# Patient Record
(demographics unavailable — no encounter records)

---

## 2025-03-06 NOTE — CARDIOLOGY SUMMARY
[Today's Date] : [unfilled] [FreeTextEntry1] : NSR, ventricular rate 58 bpm [FreeTextEntry2] : Summary: 1. Technically limited imaging secondary to poor acoustic windows. 2. Tetralogy of Fallot, status post repair. 3. Status post transcatheter pulmonary valve placement (Gutierrez Augusta). 4. S/p LPA stent placement on 2/13/2024. 5. No evidence of pulmonary valve stenosis. 6. Trivial pulmonary valve regurgitation. 7. Stent in proximal left pulmonary artery protruding into distal MPA. Left pulmonary artery is not well visualized. Antegrade flow by color Doppler is not seen; however, there is no gradient on pulse Doppler interrogation. 8. Trivial tricuspid valve regurgitation. 9. The tricuspid regurgitant jet, as recorded, is inadequate for the purpose of estimating right ventricular systolic pressure. 10. No residual ventricular septal defect. 11. Normal left ventricular morphology. 12. Qualitatively normal left ventricular systolic function. 13. Qualitatively normal right ventricular systolic function with no evidence of right ventricular dilation. 14. No pericardial effusion. [de-identified] : 01/28/2025 [de-identified] : Impression: Tetralogy of fallot s/p repair, s/p Edward [place] valve in pulmonary position. S/p LPA stents.   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 Artifact from the Edward Augusta valve in the pulmonary position. No significant pulmonary regurgitation (RF: <1% by PC flows). The right pulmonary artery is of good size. Stent artifact from the left pulmonary artery stent. It appears patent in black blood sequences. Left upper lobe branch is not adequately visualized. Left lower lobe branch seen. The left lung arborization is qualitative less than the right. On MRA, there is no uptake of contrast in the left upper lobes on the first pass.   Estimated QpR: QpL is 80.5%: 19.5% (no significant difference form the lung perfusion scan on 02/14/2024 when it was 80% to right and 20% to left).   Normal biventricular volumes and ejection fractions. No significant change from the scan in 2022.

## 2025-03-06 NOTE — CONSULT LETTER
[Today's Date] : [unfilled] [Name] : Name: [unfilled] [] : : ~~ [Today's Date:] : [unfilled] [Dear  ___:] : Dear Dr. [unfilled]: [Consult] : I had the pleasure of evaluating your patient, [unfilled]. My full evaluation follows. [Consult - Multiple Provider] : Thank you very much for allowing us to participate in the care of this patient. If you have any questions, please do not hesitate to contact us. [Sincerely,] : Sincerely, [FreeTextEntry4] : Rosemarie Lombardi Conigliaro, MD [FreeTextEntry5] : [address], [address] [FreeTextEntry6] : [place] [address] [FreeTextEntry7] : [phone] [de-identified] : Sharon Spencer, MSN, CPNP-AC, PC Pediatric Cardiology, Adult Congenital Cardiology [facility] Physician [facility]  Ayleen Martell MD, SHAHNAZ Director, Adult Congenital Heart , High Risk Cardiovascular Obstetrics [facility] Physician [facility]  [address]: [phone] [facility] Office: [phone] [facility] Office: [phone]

## 2025-03-06 NOTE — DISCUSSION/SUMMARY
[Needs SBE Prophylaxis] : [unfilled]  needs bacterial endocarditis prophylaxis. SBE prophylaxis is indicated for dental and invasive ENT procedures. (Circulation. 2007; 116: 1211-4341) [FreeTextEntry1] : In summary, Cami is a 30-year-old female with tetralogy of Fallot status staged complete repair in infancy with subsequent left pulmonary artery stent angioplasty, s/p percutaneous pulmonary valve replacement.  She is s/p LPA stent 2/13/24 with improvement of her symptoms. Her post procedure V/Q scan was unchanged.  Her last echocardiogram today demonstrates normal prosthetic valve function without any gradient and normal biventricular function.   MRI 01/2025 showed unchanged flow to left lung, QpR:QpL 80.5%:19.5%.  We spent some time discussing long term sequela of his TOF. Tetralogy of Fallot is a wide spectrum of disease with varying levels of severity. The main features include an aorta that overrides the VSD by less than 50% of its diameter, subpulmonary infundibular stenosis, a VSD, and RV hypertrophy. Tetralogy of Fallot repair essentially involves closing the VSD and removing RVOT obstruction. Depending on the individual's anatomy, elimination of the RVOT obstruction may be accomplished by resection of infundibular (muscle) stenosis, RVOT patch augmentation, reconstruction of the pulmonary valve annulus with a transannular patch or placing an extra cardiac conduit from the right ventricle to the pulmonary artery. Pulmonary valvotomy or pulmonary valve resection may be necessary if the pulmonary valve itself is stenotic.  The long-term prognosis for patients after repair of tetralogy of Fallot continues to improve, however, long complications occur. These long-term complications include the development of progressive aortic root dilation, aortic regurgitation, LV dysfunction, right ventricular outflow tract obstruction, pulmonary regurgitation, RV dysfunction, high-grade heart block (relatively uncommon), development of atrial tachyarrhythmias, endocarditis, sustained ventricular tachycardia, and sudden cardiac death. In a recent study >50% of patients with TOF over 50 years have heart failure.  Pulmonary valve replacement is indicated with moderate or more RI with symptoms or any 2 of the following: RV/LV dysfunction, severe RV dilation (RVEDVI>160 mL/m2, RVESVI >80mL/m2 or RVEDV>2x LVEDV, RVSP due to RVOT obstruction >2/3 systemic pressure or progressive reduction in exercise tolerance. In addition, sustained tachyarrhythmia in the setting of severe RI.  As per ACC ACHD guidelines, Stage B patients, with mild disease, require echos q 1-2 years, CMI q 2-3 years and exercise testing 2-5 years. Stage C include patients with moderate to severe valve disease, moderate ventricular dysfunction, NYHA C III, arrhythmias controlled with treatment. These patients require yearly echo, and holter, cMRI and exercise stress tests q1-2 years. Those with Stage D disease including severe pulmonary HTN, severe hypoxia, Eisenmenger, refractory arrhythmia or NYHA FC IV symptoms, may require echos and other testing more frequently. As per guidelines, he will have a cMRI, holter and stress test.  Plan: - Can get elective dental work at this time.  - Continue aspirin 81mg PO qday.  -    SBE Prophylaxis We also discussed good dental hygiene with routine dental visits every six months. As per ACC guidelines, endocarditis prophylaxis is recommended in those undergoing dental procedures with prosthetic cardiac valves, unrepaired cyanotic heart disease including palliative shunts and conduits, repaired CHD with residual defects at site of adjacent to patch or prosthetic device, repaired CHD with prosthetic material or device during the first 6 months after the procedure and in those patients with a history of infective endocarditis. Amoxicillin 2gm or another appropriate antibiotic should be taken 30 to 60 minutes prior to the procedure.  Avoid dental work for 3 months post stent.    Heart Healthy Lifestyle We discussed a heart-healthy lifestyle, including Mediterranean diet, weight management, and avoiding smoking/vaping and excessive alcohol. We also discussed exercising 30 minute a day.   Medical Home  Part of today's discussion included identifying a core medical team for as the patient's medical home. It is important that the patient feels comfortable with providers in subspecialties and that the providers are well-versed in the patient's cardiac condition. We can provide referrals to doctors who we have identified as collaborative and conscientious of the patient's complex medical history.

## 2025-03-06 NOTE — REASON FOR VISIT
[Follow-Up] : a follow-up visit for [Tetralogy Of Fallot] : Tetralogy of Fallot [Patient] : patient [FreeTextEntry3] : LMBTS, s/p complete repair, LPA stents, 29mm alexander valve

## 2025-03-06 NOTE — HISTORY OF PRESENT ILLNESS
[FreeTextEntry1] :  CAMI GALLARDO was seen today for evaluation in the Adult Congenital Heart Program at [facility]. As you know, she is a 30 year old female who was born with tetralogy of Fallot.  Past Cardiac Surgical/Interventional Procedures: 1) 01/24/1994, Left modified Ricky-Taussig shunt, Dr. Singer, [facility] 2) 10/06/1994, Complete repair of TOF, takedown of BT shunt, repair of LPA with [place]-Josh patch, Dr. Singer, [facility] 3) 10/19/2007, Cath, Right and left heart cath with angiography, placement of 29mm and 25mm stent in LPA, Dr. Diaz, [facility] 4) 12/09/2019, Cath, Transcatheter pulmonary valve placement with 29 mm Gutierrez S3 valve, Dr. Diaz, [facility] 5) 02/13/2024, Cath, Placement of LPA stent, Dr. Mukherjee, [facility]  Her cMRI prior to the stent demonstrated QpR: QpL is 82% :18%.  Since her stent she has a noted improvement in her energy and exercise capacity, despite no change in post procedure V/Q scan the next day.   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 No significant [place]. Right pulmonary artery good size, left pulmonary artery stent in place. No uptake of contrast into the left upper lobes on first pass during MRA. QpR:QpL is 80.5:19.5 (no significant difference pre stent). Normal biventricular volumes and EF.   Cami is seen today, 03/10/2025, and is feeling..  She denies chest discomfort, palpitations, shortness of breath, orthopnea, near syncope, or syncope. She remains on Aspirin with no bleeding or bruising.  PMHx:  -   Other PSHx:  Family Hx: - Mother- HTN, HLD - Father- HTN  Social Hx: -   .

## 2025-03-06 NOTE — DISCUSSION/SUMMARY
[Needs SBE Prophylaxis] : [unfilled]  needs bacterial endocarditis prophylaxis. SBE prophylaxis is indicated for dental and invasive ENT procedures. (Circulation. 2007; 116: 4140-1197) [FreeTextEntry1] : In summary, Cami is a 30-year-old female with tetralogy of Fallot status staged complete repair in infancy with subsequent left pulmonary artery stent angioplasty, s/p percutaneous pulmonary valve replacement.  She is s/p LPA stent 2/13/24 with improvement of her symptoms. Her post procedure V/Q scan was unchanged.  Her last echocardiogram today demonstrates normal prosthetic valve function without any gradient and normal biventricular function.   MRI 01/2025 showed unchanged flow to left lung, QpR:QpL 80.5%:19.5%.  We spent some time discussing long term sequela of his TOF. Tetralogy of Fallot is a wide spectrum of disease with varying levels of severity. The main features include an aorta that overrides the VSD by less than 50% of its diameter, subpulmonary infundibular stenosis, a VSD, and RV hypertrophy. Tetralogy of Fallot repair essentially involves closing the VSD and removing RVOT obstruction. Depending on the individual's anatomy, elimination of the RVOT obstruction may be accomplished by resection of infundibular (muscle) stenosis, RVOT patch augmentation, reconstruction of the pulmonary valve annulus with a transannular patch or placing an extra cardiac conduit from the right ventricle to the pulmonary artery. Pulmonary valvotomy or pulmonary valve resection may be necessary if the pulmonary valve itself is stenotic.  The long-term prognosis for patients after repair of tetralogy of Fallot continues to improve, however, long complications occur. These long-term complications include the development of progressive aortic root dilation, aortic regurgitation, LV dysfunction, right ventricular outflow tract obstruction, pulmonary regurgitation, RV dysfunction, high-grade heart block (relatively uncommon), development of atrial tachyarrhythmias, endocarditis, sustained ventricular tachycardia, and sudden cardiac death. In a recent study >50% of patients with TOF over 50 years have heart failure.  Pulmonary valve replacement is indicated with moderate or more [place] with symptoms or any 2 of the following: RV/LV dysfunction, severe RV dilation (RVEDVI>160 mL/m2, RVESVI >80mL/m2 or RVEDV>2x LVEDV, RVSP due to RVOT obstruction >2/3 systemic pressure or progressive reduction in exercise tolerance. In addition, sustained tachyarrhythmia in the setting of severe [place].  As per ACC ACHD guidelines, Stage B patients, with mild disease, require echos q 1-2 years, CMI q 2-3 years and exercise testing 2-5 years. Stage C include patients with moderate to severe valve disease, moderate ventricular dysfunction, NYHA C III, arrhythmias controlled with treatment. These patients require yearly echo, and holter, cMRI and exercise stress tests q1-2 years. Those with Stage D disease including severe pulmonary HTN, severe hypoxia, Eisenmenger, refractory arrhythmia or NYHA FC IV symptoms, may require echos and other testing more frequently. As per guidelines, he will have a cMRI, holter and stress test.  Plan: - Can get elective dental work at this time.  - Continue aspirin 81mg PO qday.  -    SBE Prophylaxis We also discussed good dental hygiene with routine dental visits every six months. As per ACC guidelines, endocarditis prophylaxis is recommended in those undergoing dental procedures with prosthetic cardiac valves, unrepaired cyanotic heart disease including palliative shunts and conduits, repaired CHD with residual defects at site of adjacent to patch or prosthetic device, repaired CHD with prosthetic material or device during the first 6 months after the procedure and in those patients with a history of infective endocarditis. Amoxicillin 2gm or another appropriate antibiotic should be taken 30 to 60 minutes prior to the procedure.  Avoid dental work for 3 months post stent.    Heart Healthy Lifestyle We discussed a heart-healthy lifestyle, including Mediterranean diet, weight management, and avoiding smoking/vaping and excessive alcohol. We also discussed exercising 30 minute a day.   Medical Home  Part of today's discussion included identifying a core medical team for as the patient's medical home. It is important that the patient feels comfortable with providers in subspecialties and that the providers are well-versed in the patient's cardiac condition. We can provide referrals to doctors who we have identified as collaborative and conscientious of the patient's complex medical history.

## 2025-03-06 NOTE — CARDIOLOGY SUMMARY
[Today's Date] : [unfilled] [FreeTextEntry1] : NSR, ventricular rate 58 bpm [FreeTextEntry2] : Summary: 1. Technically limited imaging secondary to poor acoustic windows. 2. Tetralogy of Fallot, status post repair. 3. Status post transcatheter pulmonary valve placement (Gutierrez Augusta). 4. S/p LPA stent placement on 2/13/2024. 5. No evidence of pulmonary valve stenosis. 6. Trivial pulmonary valve regurgitation. 7. Stent in proximal left pulmonary artery protruding into distal MPA. Left pulmonary artery is not well visualized. Antegrade flow by color Doppler is not seen; however, there is no gradient on pulse Doppler interrogation. 8. Trivial tricuspid valve regurgitation. 9. The tricuspid regurgitant jet, as recorded, is inadequate for the purpose of estimating right ventricular systolic pressure. 10. No residual ventricular septal defect. 11. Normal left ventricular morphology. 12. Qualitatively normal left ventricular systolic function. 13. Qualitatively normal right ventricular systolic function with no evidence of right ventricular dilation. 14. No pericardial effusion. [de-identified] : 01/28/2025 [de-identified] : Impression: Tetralogy of fallot s/p repair, s/p Edward [place] valve in pulmonary position. S/p LPA stents.   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 Artifact from the Edward Augusta valve in the pulmonary position. No significant pulmonary regurgitation (RF: <1% by PC flows). The right pulmonary artery is of good size. Stent artifact from the left pulmonary artery stent. It appears patent in black blood sequences. Left upper lobe branch is not adequately visualized. Left lower lobe branch seen. The left lung arborization is qualitative less than the right. On MRA, there is no uptake of contrast in the left upper lobes on the first pass.   Estimated QpR: QpL is 80.5%: 19.5% (no significant difference form the lung perfusion scan on 02/14/2024 when it was 80% to right and 20% to left).   Normal biventricular volumes and ejection fractions. No significant change from the scan in 2022.

## 2025-03-06 NOTE — HISTORY OF PRESENT ILLNESS
[FreeTextEntry1] :  CAMI GALLARDO was seen today for evaluation in the Adult Congenital Heart Program at [facility]. As you know, she is a 30 year old female who was born with tetralogy of Fallot.  Past Cardiac Surgical/Interventional Procedures: 1) 01/24/1994, Left modified Ricky-Taussig shunt, Dr. Singer, [facility] 2) 10/06/1994, Complete repair of TOF, takedown of BT shunt, repair of LPA with [place]-Josh patch, Dr. Singer, [facility] 3) 10/19/2007, Cath, Right and left heart cath with angiography, placement of 29mm and 25mm stent in LPA, Dr. Diaz, [facility] 4) 12/09/2019, Cath, Transcatheter pulmonary valve placement with 29 mm Gutierrez S3 valve, Dr. Diaz, [facility] 5) 02/13/2024, Cath, Placement of LPA stent, Dr. Mukherjee, [facility]  Her cMRI prior to the stent demonstrated QpR: QpL is 82% :18%.  Since her stent she has a noted improvement in her energy and exercise capacity, despite no change in post procedure V/Q scan the next day.   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 No significant NC. Right pulmonary artery good size, left pulmonary artery stent in place. No uptake of contrast into the left upper lobes on first pass during MRA. QpR:QpL is 80.5:19.5 (no significant difference pre stent). Normal biventricular volumes and EF.   Cami is seen today, 03/10/2025, and is feeling..  She denies chest discomfort, palpitations, shortness of breath, orthopnea, near syncope, or syncope. She remains on Aspirin with no bleeding or bruising.  PMHx:  -   Other PSHx:  Family Hx: - Mother- HTN, HLD - Father- HTN  Social Hx: -   .

## 2025-03-06 NOTE — PHYSICAL EXAM
[General Appearance - Alert] : alert [General Appearance - Well Nourished] : well nourished [General Appearance - Well-Appearing] : well appearing [Auscultation Breath Sounds / Voice Sounds] : breath sounds clear to auscultation bilaterally [Respiration, Rhythm And Depth] : normal respiratory rhythm and effort [Chest Surgical / Traumatic Scar] : chest incision well healed [Heart Sounds] : normal S1 and S2 [Nail Clubbing] : no clubbing  or cyanosis of the fingernails [FreeTextEntry7] : JAMES BENTLEY

## 2025-03-06 NOTE — CONSULT LETTER
[Today's Date] : [unfilled] [Name] : Name: [unfilled] [] : : ~~ [Today's Date:] : [unfilled] [Dear  ___:] : Dear Dr. [unfilled]: [Consult] : I had the pleasure of evaluating your patient, [unfilled]. My full evaluation follows. [Consult - Multiple Provider] : Thank you very much for allowing us to participate in the care of this patient. If you have any questions, please do not hesitate to contact us. [Sincerely,] : Sincerely, [FreeTextEntry4] : Rosemarie Lombardi Conigliaro, MD [FreeTextEntry5] : [address], [address] [FreeTextEntry6] : [place] [address] [FreeTextEntry7] : [phone] [de-identified] : Sharon Spencer, MSN, CPNP-AC, PC Pediatric Cardiology, Adult Congenital Cardiology [facility] Physician [facility]  Ayleen Martell MD, SHAHNAZ Director, Adult Congenital Heart , High Risk Cardiovascular Obstetrics [facility] Physician [facility]  [address]: [phone] [facility] Office: [phone] [facility] Office: [phone]

## 2025-04-07 NOTE — CONSULT LETTER
[Today's Date] : [unfilled] [Name] : Name: [unfilled] [] : : ~~ [Today's Date:] : [unfilled] [Dear  ___:] : Dear Dr. [unfilled]: [Consult] : I had the pleasure of evaluating your patient, [unfilled]. My full evaluation follows. [Consult - Multiple Provider] : Thank you very much for allowing us to participate in the care of this patient. If you have any questions, please do not hesitate to contact us. [Sincerely,] : Sincerely, [FreeTextEntry4] : Rosemarie Lombardi Conigliaro, MD [FreeTextEntry5] : [address], [address] [FreeTextEntry6] : [place] [address] [FreeTextEntry7] : [phone] [de-identified] : Sharon Spencer, MSN, CPNP-AC, PC Pediatric Cardiology, Adult Congenital Cardiology [facility] Physician [facility] Friend [facility]  Ayleen Martell MD, SHAHNAZ Director, Adult Congenital Heart , High Risk Cardiovascular Obstetrics [facility] Physician [facility]  [address]: [phone] [facility] Office: [phone] [facility] Office: [phone]

## 2025-04-07 NOTE — CONSULT LETTER
[Today's Date] : [unfilled] [Name] : Name: [unfilled] [] : : ~~ [Today's Date:] : [unfilled] [Dear  ___:] : Dear Dr. [unfilled]: [Consult] : I had the pleasure of evaluating your patient, [unfilled]. My full evaluation follows. [Consult - Multiple Provider] : Thank you very much for allowing us to participate in the care of this patient. If you have any questions, please do not hesitate to contact us. [Sincerely,] : Sincerely, [FreeTextEntry4] : Rosemarie Lombardi Conigliaro, MD [FreeTextEntry6] : [place] [address] [FreeTextEntry5] : [address], [address] [FreeTextEntry7] : [phone] [de-identified] : Sharon Spencer, MSN, CPNP-AC, PC Pediatric Cardiology, Adult Congenital Cardiology [facility] Physician [facility] Friend [facility]  Ayleen Martell MD, SHAHNAZ Director, Adult Congenital Heart , High Risk Cardiovascular Obstetrics [facility] Physician [facility]  [address]: [phone] [facility] Office: [phone] [facility] Office: [phone]

## 2025-04-07 NOTE — HISTORY OF PRESENT ILLNESS
[FreeTextEntry1] :  I had the pleasure of seeing CAMI GALLARDO at the [facility] Adult Congenital Heart Disease Program.  As you well know, Ms. GALLARDO is a 31 year woman with a history of tetralogy of Fallot.   Past Cardiac Surgical/Interventional Procedures: 1) 01/24/1994, Left modified Ricky-Taussig shunt, Dr. Singer, [facility] 2) 10/06/1994, Complete repair of TOF, takedown of BT shunt, repair of LPA with [place]-Josh patch, Dr. Singer, [facility] 3) 10/19/2007, Cath, Right and left heart cath with angiography, placement of 29mm and 25mm stent in LPA, Dr. Diaz, [facility] 4) 12/09/2019, Transcatheter pulmonary valve placement with 29 mm Gutierrez S3 valve, Dr. Diaz, [facility] 5) 02/13/2024, Placement of LPA, Dr. Mukherjee, [facility]  Of note her cMRI prior to the stent demonstrated QpR:QpL is 82%:18%. Since her stent she has noted improvement in her energy and exercise capacity, despite no change in post procedure V/Q scan the next day.  Cami is seen today, 03/31/2025, feeling well. She states she feels the best she's felt in a long time. She is exercising regularly with no limitations.   She denies chest pain, shortness of breath, palpitations, cough, hemoptysis, dizziness, syncope, orthopnea, paroxysmal nocturnal dyspnea, abdominal swelling or lower extremity swelling. She can climb a flight of stairs without difficulty.   PMHx: - Denies  Other PSHx: - Denies  Family Hx; - Mother- HLD, HTN - Father- HTN  Social Hx: - She works as a .  - Rare EtOH, denies smoking or illicit drug use. - She exercises on a regular basis by going HIT training.  - She goes to the dentist on a regular basis.

## 2025-04-07 NOTE — DISCUSSION/SUMMARY
[Needs SBE Prophylaxis] : [unfilled]  needs bacterial endocarditis prophylaxis. SBE prophylaxis is indicated for dental and invasive ENT procedures. (Circulation. 2007; 116: 0721-1497) [FreeTextEntry1] : In summary, Cami is a 31-year-old female with a history of tetralogy of Fallot s/p staged complete repair in infancy with subsequent left pulmonary artery stent angioplasty, s/p percutaneous pulmonary valve replacement. She is s/p LPA stent 2/13/24 with improvement in her symptoms. Her post procedure V/Q scan was unchanged. Cardiac MRI demonstrated estimated QpR: QpL is 80.5%: 19.5% (no significant difference from the lung perfusion scan on 2/14/2024), normal functioning PVR and normal RV/LV volumes and function. Her echocardiogram today demonstrates similar findings.   Plan: - Continue aspirin 81mg PO qday.  - Cardiac MRA showed QpR:QpL 80.5%:19.5%, no change since prior  - Blood pressure elevated today, but normal previously. Advised to have BP monitored at PCP office.  - Encouraged to decrease sodium intake.  - Obtain CPET.  - F/u in 6 mos for a BP check with CPET   SBE Prophylaxis We also discussed good dental hygiene with routine dental visits every six months. As per ACC guidelines, endocarditis prophylaxis is recommended in those undergoing dental procedures with prosthetic cardiac valves, unrepaired cyanotic heart disease including palliative shunts and conduits, repaired CHD with residual defects at site of adjacent to patch or prosthetic device, repaired CHD with prosthetic material or device during the first 6 months after the procedure and in those patients with a history of infective endocarditis. Amoxicillin 2gm or another appropriate antibiotic should be taken 30 to 60 minutes prior to the procedure.   Heart Healthy Lifestyle We discussed a heart-healthy lifestyle, including Mediterranean diet, weight management, and avoiding smoking/vaping and excessive alcohol. We also discussed exercising 30 minute a day.

## 2025-04-07 NOTE — CONSULT LETTER
[Today's Date] : [unfilled] [Name] : Name: [unfilled] [] : : ~~ [Today's Date:] : [unfilled] [Dear  ___:] : Dear Dr. [unfilled]: [Consult] : I had the pleasure of evaluating your patient, [unfilled]. My full evaluation follows. [Consult - Multiple Provider] : Thank you very much for allowing us to participate in the care of this patient. If you have any questions, please do not hesitate to contact us. [Sincerely,] : Sincerely, [FreeTextEntry4] : Rosemarie Lombardi Conigliaro, MD [FreeTextEntry5] : [address] [facility] [facility], [address] [FreeTextEntry6] : [place] [address] [FreeTextEntry7] : [phone] [de-identified] : Sharon Spencer, MSN, CPNP-AC, PC Pediatric Cardiology, Adult Congenital Cardiology [facility] Physician [facility]  Ayleen Martell MD, SHAHNAZ Director, Adult Congenital Heart , High Risk Cardiovascular Obstetrics [facility] Physician [facility]  [address]: [phone] [facility] Office: [phone] [facility] Office: [phone]

## 2025-04-07 NOTE — HISTORY OF PRESENT ILLNESS
[FreeTextEntry1] :  I had the pleasure of seeing CAMI GALLARDO at the [facility] Congenital [facility] Disease [facility].  As you well know, Ms. GALLARDO is a 31 year woman with a history of tetralogy of Fallot.   Past Cardiac Surgical/Interventional Procedures: 1) 01/24/1994, Left modified Ricky-Taussig shunt, Dr. Singer, [facility] 2) 10/06/1994, Complete repair of TOF, takedown of BT shunt, repair of LPA with [place]-Josh patch, Dr. Singer, [facility] 3) 10/19/2007, Cath, Right and left heart cath with angiography, placement of 29mm and 25mm stent in LPA, Dr. Diaz, [facility] 4) 12/09/2019, Transcatheter pulmonary valve placement with 29 mm Gutierrez S3 valve, Dr. Diaz, [facility] 5) 02/13/2024, Placement of LPA, Dr. Mukherjee, [facility]  Of note her cMRI prior to the stent demonstrated QpR:QpL is 82%:18%. Since her stent she has noted improvement in her energy and exercise capacity, despite no change in post procedure V/Q scan the next day.  Cami is seen today, 03/31/2025, feeling well. She states she feels the best she's felt in a long time. She is exercising regularly with no limitations.   She denies chest pain, shortness of breath, palpitations, cough, hemoptysis, dizziness, syncope, orthopnea, paroxysmal nocturnal dyspnea, abdominal swelling or lower extremity swelling. She can climb a flight of stairs without difficulty.   PMHx: - Denies  Other PSHx: - Denies  Family Hx; - Mother- HLD, HTN - Father- HTN  Social Hx: - She works as a .  - Rare EtOH, denies smoking or illicit drug use. - She exercises on a regular basis by going HIT training.  - She goes to the dentist on a regular basis.

## 2025-04-07 NOTE — CARDIOLOGY SUMMARY
[de-identified] : 03/15/2024 [Today's Date] : [unfilled] [FreeTextEntry1] : NSR, RAD, NSST/T wave changes [FreeTextEntry2] : Summary: 1. Technically limited imaging secondary to poor acoustic windows.  2. Compared to the previous echocardiogram; no significant change.  3. Tetralogy of Fallot, status post repair.  4. Status post transcatheter pulmonary valve placement (Gutierrez Augusta). Trivial pulmonary valve insufficiency and no evidence of pulmonary valve stenosis.  5. Stent in proximal left pulmonary artery protruding into distal MPA. Left pulmonary artery is not well delineated in this study.  6. Trivial tricuspid valve regurgitation.  7. The tricuspid regurgitant jet, as recorded, is inadequate for the purpose of estimating right ventricular systolic pressure.  8. Normal left ventricular morphology.  9. Qualitatively normal left ventricular systolic function.  10. Qualitatively normal right ventricular systolic function with no evidence of right ventricular dilation.  11. No residual ventricular septal defect.  12. No significant aortic valve insufficiency in limited views. Previously reported trivial aortic valve insufficiency is not appreciated in this study, likely secondary to poor acoustic windows.  13. No pericardial effusion.  [de-identified] : 01/28/2025 [de-identified] : IMPRESSION: Tetralogy of Fallot s/p repair, s/p Edward Augusta valve in pulmonary position. S/p LPA stents.   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 Artifact from the Edward Augusta valve in the pulmonary position. No significant pulmonary regurgitation (RF: <1% by PC flows). The right pulmonary artery is of good size. Stent artifact from the left pulmonary artery stent. It appears patent in black blood sequences. Left upper lobe branch is not adequately visualized. Left lower lobe branch seen. The left lung arborization is qualitative less than the right. On MRA, there is no uptake of contrast in the left upper lobes on the first pass.  Estimated QpR:QpL is 80.5%:19.5% (no significant difference from the lung perfusion scan on 2/14/2024 when it was 80% to right and 20% to left).  Normal biventricular volumes and ejection fractions. No significant change from the scan in 2022.

## 2025-04-07 NOTE — REASON FOR VISIT
[Follow-Up] : a follow-up visit for [S/P Cardiac Surgery] : status post cardiac surgery [Patient] : patient [Medical Records] : medical records [FreeTextEntry3] : ToF, pulm valve replacement

## 2025-04-07 NOTE — CARDIOLOGY SUMMARY
[de-identified] : 03/15/2024 [Today's Date] : [unfilled] [FreeTextEntry1] : NSR, RAD, NSST/T wave changes [FreeTextEntry2] : Summary: 1. Technically limited imaging secondary to poor acoustic windows.  2. Compared to the previous echocardiogram; no significant change.  3. Tetralogy of Fallot, status post repair.  4. Status post transcatheter pulmonary valve placement (Gutierrez Augusta). Trivial pulmonary valve insufficiency and no evidence of pulmonary valve stenosis.  5. Stent in proximal left pulmonary artery protruding into distal MPA. Left pulmonary artery is not well delineated in this study.  6. Trivial tricuspid valve regurgitation.  7. The tricuspid regurgitant jet, as recorded, is inadequate for the purpose of estimating right ventricular systolic pressure.  8. Normal left ventricular morphology.  9. Qualitatively normal left ventricular systolic function.  10. Qualitatively normal right ventricular systolic function with no evidence of right ventricular dilation.  11. No residual ventricular septal defect.  12. No significant aortic valve insufficiency in limited views. Previously reported trivial aortic valve insufficiency is not appreciated in this study, likely secondary to poor acoustic windows.  13. No pericardial effusion.  [de-identified] : 01/28/2025 [de-identified] : IMPRESSION: Tetralogy of Fallot s/p repair, s/p Edward Augusta valve in pulmonary position. S/p LPA stents.   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 Artifact from the Edward Augusta valve in the pulmonary position. No significant pulmonary regurgitation (RF: <1% by PC flows). The right pulmonary artery is of good size. Stent artifact from the left pulmonary artery stent. It appears patent in black blood sequences. Left upper lobe branch is not adequately visualized. Left lower lobe branch seen. The left lung arborization is qualitative less than the right. On MRA, there is no uptake of contrast in the left upper lobes on the first pass.  Estimated QpR:QpL is 80.5%:19.5% (no significant difference from the lung perfusion scan on 2/14/2024 when it was 80% to right and 20% to left).  Normal biventricular volumes and ejection fractions. No significant change from the scan in 2022.

## 2025-04-07 NOTE — REVIEW OF SYSTEMS
[Feeling Poorly] : not feeling poorly (malaise) [Fever] : no fever [Wgt Loss (___ Lbs)] : no recent weight loss [Pallor] : not pale [Eye Discharge] : no eye discharge [Redness] : no redness [Change in Vision] : no change in vision [Nasal Stuffiness] : no nasal congestion [Sore Throat] : no sore throat [Earache] : no earache [Loss Of Hearing] : no hearing loss [Cyanosis] : no cyanosis [Edema] : no edema [Diaphoresis] : not diaphoretic [Chest Pain] : no chest pain or discomfort [Exercise Intolerance] : no persistence of exercise intolerance [Palpitations] : no palpitations [Orthopnea] : no orthopnea [Fast HR] : no tachycardia [Tachypnea] : not tachypneic [Wheezing] : no wheezing [Cough] : no cough [Shortness Of Breath] : not expressed as feeling short of breath [Vomiting] : no vomiting [Diarrhea] : no diarrhea [Abdominal Pain] : no abdominal pain [Decrease In Appetite] : appetite not decreased [Fainting (Syncope)] : no fainting [Seizure] : no seizures [Headache] : no headache [Dizziness] : no dizziness [Limping] : no limping [Joint Pains] : no arthralgias [Joint Swelling] : no joint swelling [Rash] : no rash [Wound problems] : no wound problems [Easy Bruising] : no tendency for easy bruising [Swollen Glands] : no lymphadenopathy [Easy Bleeding] : no ~M tendency for easy bleeding [Nosebleeds] : no epistaxis [Sleep Disturbances] : ~T no sleep disturbances [Hyperactive] : no hyperactive behavior [Depression] : no depression [Anxiety] : no anxiety [Failure To Thrive] : no failure to thrive [Short Stature] : short stature was not noted [Jitteriness] : no jitteriness [Heat/Cold Intolerance] : no temperature intolerance [Dec Urine Output] : no oliguria [FreeTextEntry1] : Pt states feeling better since taking aspirin and being able to exercise. Lost 15 pounds since last seen.

## 2025-04-07 NOTE — CARDIOLOGY SUMMARY
[de-identified] : 03/15/2024 [Today's Date] : [unfilled] [FreeTextEntry1] : NSR, RAD, NSST/T wave changes [FreeTextEntry2] : Summary: 1. Technically limited imaging secondary to poor acoustic windows.  2. Compared to the previous echocardiogram; no significant change.  3. Tetralogy of Fallot, status post repair.  4. Status post transcatheter pulmonary valve placement (Gutierrez Augusta). Trivial pulmonary valve insufficiency and no evidence of pulmonary valve stenosis.  5. Stent in proximal left pulmonary artery protruding into distal MPA. Left pulmonary artery is not well delineated in this study.  6. Trivial tricuspid valve regurgitation.  7. The tricuspid regurgitant jet, as recorded, is inadequate for the purpose of estimating right ventricular systolic pressure.  8. Normal left ventricular morphology.  9. Qualitatively normal left ventricular systolic function.  10. Qualitatively normal right ventricular systolic function with no evidence of right ventricular dilation.  11. No residual ventricular septal defect.  12. No significant aortic valve insufficiency in limited views. Previously reported trivial aortic valve insufficiency is not appreciated in this study, likely secondary to poor acoustic windows.  13. No pericardial effusion.  [de-identified] : 01/28/2025 [de-identified] : IMPRESSION: Tetralogy of Fallot s/p repair, s/p Edward Augusta valve in pulmonary position. S/p LPA stents.   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 Artifact from the Edward Augusta valve in the pulmonary position. No significant pulmonary regurgitation (RF: <1% by PC flows). The right pulmonary artery is of good size. Stent artifact from the left pulmonary artery stent. It appears patent in black blood sequences. Left upper lobe branch is not adequately visualized. Left lower lobe branch seen. The left lung arborization is qualitative less than the right. On MRA, there is no uptake of contrast in the left upper lobes on the first pass.  Estimated QpR:QpL is 80.5%:19.5% (no significant difference from the lung perfusion scan on 2/14/2024 when it was 80% to right and 20% to left).  Normal biventricular volumes and ejection fractions. No significant change from the scan in 2022.

## 2025-04-07 NOTE — HISTORY OF PRESENT ILLNESS
[FreeTextEntry1] :  I had the pleasure of seeing CAMI GALLARDO at the [facility] Adult Congenital Heart Disease [facility].  As you well know, Ms. GALLARDO is a 31 year woman with a history of tetralogy of Fallot.   Past Cardiac Surgical/Interventional Procedures: 1) 01/24/1994, Left modified Ricky-Taussig shunt, Dr. Singer, [facility] 2) 10/06/1994, Complete repair of TOF, takedown of BT shunt, repair of LPA with [place]-Josh patch, Dr. Singer, [facility] 3) 10/19/2007, Cath, Right and left heart cath with angiography, placement of 29mm and 25mm stent in LPA, Dr. Diaz, [facility] 4) 12/09/2019, Transcatheter pulmonary valve placement with 29 mm Gutierrez S3 valve, Dr. Diaz, [facility] 5) 02/13/2024, Placement of LPA, Dr. Mukherjee, [facility]  Of note her cMRI prior to the stent demonstrated QpR:QpL is 82%:18%. Since her stent she has noted improvement in her energy and exercise capacity, despite no change in post procedure V/Q scan the next day.  Cami is seen today, 03/31/2025, feeling well. She states she feels the best she's felt in a long time. She is exercising regularly with no limitations.   She denies chest pain, shortness of breath, palpitations, cough, hemoptysis, dizziness, syncope, orthopnea, paroxysmal nocturnal dyspnea, abdominal swelling or lower extremity swelling. She can climb a flight of stairs without difficulty.   PMHx: - Denies  Other PSHx: - Denies  Family Hx; - Mother- HLD, HTN - Father- HTN  Social Hx: - She works as a .  - Rare EtOH, denies smoking or illicit drug use. - She exercises on a regular basis by going HIT training.  - She goes to the dentist on a regular basis.

## 2025-04-07 NOTE — DISCUSSION/SUMMARY
[Needs SBE Prophylaxis] : [unfilled]  needs bacterial endocarditis prophylaxis. SBE prophylaxis is indicated for dental and invasive ENT procedures. (Circulation. 2007; 116: 4380-0204) [FreeTextEntry1] : In summary, Cami is a 31-year-old female with a history of tetralogy of Fallot s/p staged complete repair in infancy with subsequent left pulmonary artery stent angioplasty, s/p percutaneous pulmonary valve replacement. She is s/p LPA stent 2/13/24 with improvement in her symptoms. Her post procedure V/Q scan was unchanged. Cardiac MRI demonstrated estimated QpR: QpL is 80.5%: 19.5% (no significant difference from the lung perfusion scan on 2/14/2024), normal functioning PVR and normal RV/LV volumes and function. Her echocardiogram today demonstrates similar findings.   Plan: - Continue aspirin 81mg PO qday.  - Cardiac MRA showed QpR:QpL 80.5%:19.5%, no change since prior  - Blood pressure elevated today, but normal previously. Advised to have BP monitored at PCP office.  - Encouraged to decrease sodium intake.  - Obtain CPET.  - F/u in 6 mos for a BP check with CPET   SBE Prophylaxis We also discussed good dental hygiene with routine dental visits every six months. As per ACC guidelines, endocarditis prophylaxis is recommended in those undergoing dental procedures with prosthetic cardiac valves, unrepaired cyanotic heart disease including palliative shunts and conduits, repaired CHD with residual defects at site of adjacent to patch or prosthetic device, repaired CHD with prosthetic material or device during the first 6 months after the procedure and in those patients with a history of infective endocarditis. Amoxicillin 2gm or another appropriate antibiotic should be taken 30 to 60 minutes prior to the procedure.   Heart Healthy Lifestyle We discussed a heart-healthy lifestyle, including Mediterranean diet, weight management, and avoiding smoking/vaping and excessive alcohol. We also discussed exercising 30 minute a day.

## 2025-04-07 NOTE — DISCUSSION/SUMMARY
[Needs SBE Prophylaxis] : [unfilled]  needs bacterial endocarditis prophylaxis. SBE prophylaxis is indicated for dental and invasive ENT procedures. (Circulation. 2007; 116: 6137-7432) [FreeTextEntry1] : In summary, Cami is a 31-year-old female with a history of tetralogy of Fallot s/p staged complete repair in infancy with subsequent left pulmonary artery stent angioplasty, s/p percutaneous pulmonary valve replacement. She is s/p LPA stent 2/13/24 with improvement in her symptoms. Her post procedure V/Q scan was unchanged. Cardiac MRI demonstrated estimated QpR: QpL is 80.5%: 19.5% (no significant difference from the lung perfusion scan on 2/14/2024), normal functioning PVR and normal RV/LV volumes and function. Her echocardiogram today demonstrates similar findings.   Plan: - Continue aspirin 81mg PO qday.  - Cardiac MRA showed QpR:QpL 80.5%:19.5%, no change since prior  - Blood pressure elevated today, but normal previously. Advised to have BP monitored at PCP office.  - Encouraged to decrease sodium intake.  - Obtain CPET.  - F/u in 6 mos for a BP check with CPET   SBE Prophylaxis We also discussed good dental hygiene with routine dental visits every six months. As per ACC guidelines, endocarditis prophylaxis is recommended in those undergoing dental procedures with prosthetic cardiac valves, unrepaired cyanotic heart disease including palliative shunts and conduits, repaired CHD with residual defects at site of adjacent to patch or prosthetic device, repaired CHD with prosthetic material or device during the first 6 months after the procedure and in those patients with a history of infective endocarditis. Amoxicillin 2gm or another appropriate antibiotic should be taken 30 to 60 minutes prior to the procedure.   Heart Healthy Lifestyle We discussed a heart-healthy lifestyle, including Mediterranean diet, weight management, and avoiding smoking/vaping and excessive alcohol. We also discussed exercising 30 minute a day.     Yes

## 2025-05-06 NOTE — HISTORY OF PRESENT ILLNESS
[FreeTextEntry1] : I had the pleasure of seeing CAMI GALLARDO at the [facility] Congenital [facility] Disease [facility].  As you well know, Ms. GALLARDO is a 31 year woman with a history of tetralogy of Fallot.   Past Cardiac Surgical/Interventional Procedures: 1) 01/24/1994, Left modified Ricky-Taussig shunt, Dr. Singer, [facility] 2) 10/06/1994, Complete repair of TOF, takedown of BT shunt, repair of LPA with [place]-Josh patch, Dr. Singer, [facility] 3) 10/19/2007, Cath, Right and left heart cath with angiography, placement of 29mm and 25mm stent in LPA, Dr. Diaz, [facility] 4) 12/09/2019, Transcatheter pulmonary valve placement with 29 mm Gutierrez S3 valve, Dr. Diaz, [facility] 5) 02/13/2024, Placement of LPA, Dr. Mukherjee, [facility]  Of note her cMRI prior to the stent demonstrated QpR:QpL is 82%:18%. Since her stent she has noted improvement in her energy and exercise capacity, despite no change in post procedure V/Q scan the next day.  Cami is seen today, 05/05/2025, with new symptoms. Yesterday, she went to an exercise class, and while she was exerting herself, she didn't feel abnormal. Towards the end of the class, while doing a plank, she felt that she wasn't able to do it. Since then, she has felt that she gets winded easier. She also intermittently feels chest discomfort in the center of her chest. It comes and goes randomly. It is not tender to palpation. It lasts less than a minute. She denies associated symptoms. She was googling her symptoms and got worried so asked to be seen today. She denies any recent health changes, illnesses, or medication changes. She takes her aspirin daily.   She denies shortness of breath, palpitations, cough, hemoptysis, dizziness, syncope, orthopnea, paroxysmal nocturnal dyspnea, abdominal swelling, lower extremity swelling, fever, runny nose, congestion, decreased oral intake, decreased urine output, diarrhea, constipation, n/v.  PMHx: - Denies  Other PSHx: - Denies  Family Hx; - Mother- HLD, HTN - Father- HTN  Social Hx: - She works as a .  - Rare EtOH, denies smoking or illicit drug use. - She exercises on a regular basis by going HIT training.  - She goes to the dentist on a regular basis.

## 2025-05-06 NOTE — CARDIOLOGY SUMMARY
[de-identified] : 5/5/25 [FreeTextEntry1] : sinus rhythm, prolonged QTc [de-identified] : 3/31/25 [FreeTextEntry2] : Summary: 1. Technically limited imaging secondary to poor acoustic windows.  2. Compared to the previous echocardiogram; no significant change.  3. Tetralogy of Fallot, status post repair.  4. Status post transcatheter pulmonary valve placement (Gutierrez Augusta). Trivial pulmonary valve insufficiency and no evidence of pulmonary valve stenosis.  5. Stent in proximal left pulmonary artery protruding into distal MPA. Left pulmonary artery is not well delineated in this study.  6. Trivial tricuspid valve regurgitation.  7. The tricuspid regurgitant jet, as recorded, is inadequate for the purpose of estimating right ventricular systolic pressure.  8. Normal left ventricular morphology.  9. Qualitatively normal left ventricular systolic function.  10. Qualitatively normal right ventricular systolic function with no evidence of right ventricular dilation.  11. No residual ventricular septal defect.  12. No significant aortic valve insufficiency in limited views. Previously reported trivial aortic valve insufficiency is not appreciated in this study, likely secondary to poor acoustic windows.  13. No pericardial effusion.  [de-identified] : 01/28/2025 [de-identified] : IMPRESSION: Tetralogy of Fallot s/p repair, s/p Edward Augusta valve in pulmonary position. S/p LPA stents.   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 Artifact from the Edward Augusta valve in the pulmonary position. No significant pulmonary regurgitation (RF: <1% by PC flows). The right pulmonary artery is of good size. Stent artifact from the left pulmonary artery stent. It appears patent in black blood sequences. Left upper lobe branch is not adequately visualized. Left lower lobe branch seen. The left lung arborization is qualitative less than the right. On MRA, there is no uptake of contrast in the left upper lobes on the first pass.  Estimated QpR:QpL is 80.5%:19.5% (no significant difference from the lung perfusion scan on 2/14/2024 when it was 80% to right and 20% to left).  Normal biventricular volumes and ejection fractions. No significant change from the scan in 2022.

## 2025-05-06 NOTE — CARDIOLOGY SUMMARY
[de-identified] : 5/5/25 [FreeTextEntry1] : sinus rhythm, prolonged QTc [de-identified] : 3/31/25 [FreeTextEntry2] : Summary: 1. Technically limited imaging secondary to poor acoustic windows.  2. Compared to the previous echocardiogram; no significant change.  3. Tetralogy of Fallot, status post repair.  4. Status post transcatheter pulmonary valve placement (Gutierrez Augusta). Trivial pulmonary valve insufficiency and no evidence of pulmonary valve stenosis.  5. Stent in proximal left pulmonary artery protruding into distal MPA. Left pulmonary artery is not well delineated in this study.  6. Trivial tricuspid valve regurgitation.  7. The tricuspid regurgitant jet, as recorded, is inadequate for the purpose of estimating right ventricular systolic pressure.  8. Normal left ventricular morphology.  9. Qualitatively normal left ventricular systolic function.  10. Qualitatively normal right ventricular systolic function with no evidence of right ventricular dilation.  11. No residual ventricular septal defect.  12. No significant aortic valve insufficiency in limited views. Previously reported trivial aortic valve insufficiency is not appreciated in this study, likely secondary to poor acoustic windows.  13. No pericardial effusion.  [de-identified] : 01/28/2025 [de-identified] : IMPRESSION: Tetralogy of Fallot s/p repair, s/p Edward Augusta valve in pulmonary position. S/p LPA stents.   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 Artifact from the Edward Augusta valve in the pulmonary position. No significant pulmonary regurgitation (RF: <1% by PC flows). The right pulmonary artery is of good size. Stent artifact from the left pulmonary artery stent. It appears patent in black blood sequences. Left upper lobe branch is not adequately visualized. Left lower lobe branch seen. The left lung arborization is qualitative less than the right. On MRA, there is no uptake of contrast in the left upper lobes on the first pass.  Estimated QpR:QpL is 80.5%:19.5% (no significant difference from the lung perfusion scan on 2/14/2024 when it was 80% to right and 20% to left).  Normal biventricular volumes and ejection fractions. No significant change from the scan in 2022.

## 2025-05-06 NOTE — CONSULT LETTER
[Today's Date] : [unfilled] [Name] : Name: [unfilled] [] : : ~~ [Today's Date:] : [unfilled] [Dear  ___:] : Dear Dr. [unfilled]: [Consult] : I had the pleasure of evaluating your patient, [unfilled]. My full evaluation follows. [Consult - Single Provider] : Thank you very much for allowing me to participate in the care of this patient. If you have any questions, please do not hesitate to contact me. [Sincerely,] : Sincerely, [Consult - Multiple Provider] : Thank you very much for allowing us to participate in the care of this patient. If you have any questions, please do not hesitate to contact us. [FreeTextEntry4] : Rosemarie Lombardi Conigliaro, MD [FreeTextEntry5] : [address] [facility] [facility], [address] [FreeTextEntry6] : [place] [address] [FreeTextEntry7] : [phone] [de-identified] : In summary, Cami is a 31-year-old female with a history of tetralogy of Fallot s/p staged complete repair in infancy with subsequent left pulmonary artery stent angioplasty, s/p percutaneous pulmonary valve replacement. She is s/p LPA stent 2/13/24. She comes into clinic today w/ acute symptoms of decreased exercise tolerance and intermittent, random, self-resolving chest pain w/ no associated symptoms. She does endorse that when she worked out yesterday she was going on 4 hours of sleep, so may have pushed herself to hard without resting. She also endorses that she may have caused herself some anxiety by googling her symptoms. Her EKG, vitals, and physical exam today are all at baseline and not concerning. Will plan on having her rest and not exercise for the rest of the week and see if she can allow herself to recover somewhat. She was advised to reach out to us if symptoms were to persist, worsen, or change; at which point we may get an echo and or labs and or a Zio. Advised to f/u w/ Dr. Martell as previously scheduled.  Plan: - Continue aspirin 81mg PO qday.  - Rest for the next week - Reach out to us if symptoms were to persist, worsen, or change; at which point we may get an echo and or labs and or a Zio - F/u w/ Dr. Martell as previously scheduled [de-identified] : [facility]

## 2025-05-06 NOTE — REASON FOR VISIT
[Follow-Up] : a follow-up visit for [Patient] : patient [S/P Cardiac Surgery] : status post cardiac surgery [S/P Catheterization] : status post catheterization [Tetralogy Of Fallot] : Tetralogy of Fallot [FreeTextEntry3] : ToF, pulm valve replacement [Medical Records] : medical records

## 2025-05-06 NOTE — PHYSICAL EXAM
[General Appearance - Alert] : alert [General Appearance - Well Nourished] : well nourished [General Appearance - Well Developed] : well developed [Facies] : the head and face were normal in appearance [EOMI] : ~T the extraocular movements were intact [Examination Of The Oral Cavity] : mucous membranes were moist and pink [] : no respiratory distress [No Cough] : no cough [Auscultation Breath Sounds / Voice Sounds] : breath sounds clear to auscultation bilaterally [Stridor] : no stridor was observed [Respiration, Rhythm And Depth] : normal respiratory rhythm and effort [Chest Surgical / Traumatic Scar] : chest incision well healed [Apical Impulse] : quiet precordium with normal apical impulse [Heart Rate And Rhythm] : normal heart rate and rhythm [Heart Sounds] : normal S1 and S2 [Heart Sounds Gallop] : no gallops [Heart Sounds Pericardial Friction Rub] : no pericardial rub [Edema] : no edema [Arterial Pulses] : normal upper and lower extremity pulses with no pulse delay [Heart Sounds Click] : no clicks [Capillary Refill Test] : normal capillary refill [Systolic] : systolic [I] : a grade 1/6  [LUSB] : LUSB [Bowel Sounds] : normal bowel sounds [Abdomen Soft] : soft [Musculoskeletal Exam: Normal Movement Of All Extremities] : normal movements of all extremities [Nail Clubbing] : no clubbing  or cyanosis of the fingers [Delayed Developmental Milestones] : normal neurologic development for age [Motor Tone] : normal muscle strength and tone [Demonstrated Behavior - Infant Nonreactive To Parents] : interactive [Mood] : mood and affect were appropriate for age

## 2025-05-06 NOTE — CONSULT LETTER
[Today's Date] : [unfilled] [Name] : Name: [unfilled] [] : : ~~ [Today's Date:] : [unfilled] [Dear  ___:] : Dear Dr. [unfilled]: [Consult] : I had the pleasure of evaluating your patient, [unfilled]. My full evaluation follows. [Consult - Single Provider] : Thank you very much for allowing me to participate in the care of this patient. If you have any questions, please do not hesitate to contact me. [Sincerely,] : Sincerely, [Consult - Multiple Provider] : Thank you very much for allowing us to participate in the care of this patient. If you have any questions, please do not hesitate to contact us. [FreeTextEntry4] : Rosemarie Lombardi Conigliaro, MD [FreeTextEntry5] : [address] [facility], [address] [FreeTextEntry6] : [place] [address] [FreeTextEntry7] : [phone] [de-identified] : In summary, Cami is a 31-year-old female with a history of tetralogy of Fallot s/p staged complete repair in infancy with subsequent left pulmonary artery stent angioplasty, s/p percutaneous pulmonary valve replacement. She is s/p LPA stent 2/13/24. She comes into clinic today w/ acute symptoms of decreased exercise tolerance and intermittent, random, self-resolving chest pain w/ no associated symptoms. She does endorse that when she worked out yesterday she was going on 4 hours of sleep, so may have pushed herself to hard without resting. She also endorses that she may have caused herself some anxiety by googling her symptoms. Her EKG, vitals, and physical exam today are all at baseline and not concerning. Will plan on having her rest and not exercise for the rest of the week and see if she can allow herself to recover somewhat. She was advised to reach out to us if symptoms were to persist, worsen, or change; at which point we may get an echo and or labs and or a Zio. Advised to f/u w/ Dr. Martell as previously scheduled.  Plan: - Continue aspirin 81mg PO qday.  - Rest for the next week - Reach out to us if symptoms were to persist, worsen, or change; at which point we may get an echo and or labs and or a Zio - F/u w/ Dr. Martell as previously scheduled [de-identified] : [facility]

## 2025-05-06 NOTE — DISCUSSION/SUMMARY
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 She is s/p LPA stent 2/13/24. She comes into clinic today w/ acute symptoms of decreased exercise tolerance and intermittent, random, self-resolving chest pain w/ no associated symptoms. She does endorse that when she worked out yesterday she was going on 4 hours of sleep, so may have pushed herself to hard without resting. She also endorses that she may have caused herself some anxiety by googling her symptoms. Her EKG, vitals, and physical exam today are all at baseline and not concerning. Will plan on having her rest and not exercise for the rest of the week and see if she can allow herself to recover somewhat. She was advised to reach out to us if symptoms were to persist, worsen, or change; at which point we may get an echo and or labs and or a Zio. Advised to f/u w/ Dr. Martell as previously scheduled.  Plan: - Continue aspirin 81mg PO qday.  - Rest for the next week - Reach out to us if symptoms were to persist, worsen, or change; at which point we may get an echo and or labs and or a Zio - F/u w/ Dr. Martell as previously scheduled [Needs SBE Prophylaxis] : [unfilled]  needs bacterial endocarditis prophylaxis. SBE prophylaxis is indicated for dental and invasive ENT procedures. (Circulation. 2007; 116: 0338-6701)

## 2025-05-06 NOTE — HISTORY OF PRESENT ILLNESS
[FreeTextEntry1] : I had the pleasure of seeing CAMI GALLARDO at the [facility].  As you well know, Ms. GALLARDO is a 31 year woman with a history of tetralogy of Fallot.   Past Cardiac Surgical/Interventional Procedures: 1) 01/24/1994, Left modified Ricky-Taussig shunt, Dr. Singer, [facility] 2) 10/06/1994, Complete repair of TOF, takedown of BT shunt, repair of LPA with [place]-Josh patch, Dr. Singer, [facility] 3) 10/19/2007, Cath, Right and left heart cath with angiography, placement of 29mm and 25mm stent in LPA, Dr. Diaz, [facility] 4) 12/09/2019, Transcatheter pulmonary valve placement with 29 mm Gutierrez S3 valve, Dr. Diaz, [facility] 5) 02/13/2024, Placement of LPA, Dr. Mukherjee, [facility]  Of note her cMRI prior to the stent demonstrated QpR:QpL is 82%:18%. Since her stent she has noted improvement in her energy and exercise capacity, despite no change in post procedure V/Q scan the next day.  Cami is seen today, 05/05/2025, with new symptoms. Yesterday, she went to an exercise class, and while she was exerting herself, she didn't feel abnormal. Towards the end of the class, while doing a plank, she felt that she wasn't able to do it. Since then, she has felt that she gets winded easier. She also intermittently feels chest discomfort in the center of her chest. It comes and goes randomly. It is not tender to palpation. It lasts less than a minute. She denies associated symptoms. She was googling her symptoms and got worried so asked to be seen today. She denies any recent health changes, illnesses, or medication changes. She takes her aspirin daily.   She denies shortness of breath, palpitations, cough, hemoptysis, dizziness, syncope, orthopnea, paroxysmal nocturnal dyspnea, abdominal swelling, lower extremity swelling, fever, runny nose, congestion, decreased oral intake, decreased urine output, diarrhea, constipation, n/v.  PMHx: - Denies  Other PSHx: - Denies  Family Hx; - Mother- HLD, HTN - Father- HTN  Social Hx: - She works as a .  - Rare EtOH, denies smoking or illicit drug use. - She exercises on a regular basis by going HIT training.  - She goes to the dentist on a regular basis.

## 2025-05-06 NOTE — REVIEW OF SYSTEMS
[Feeling Poorly] : feeling poorly (malaise) [Fever] : no fever [Wgt Loss (___ Lbs)] : no recent weight loss [Pallor] : not pale [Eye Discharge] : no eye discharge [Redness] : no redness [Change in Vision] : no change in vision [Nasal Stuffiness] : no nasal congestion [Sore Throat] : no sore throat [Earache] : no earache [Loss Of Hearing] : no hearing loss [Cyanosis] : no cyanosis [Edema] : no edema [Diaphoresis] : not diaphoretic [Chest Pain] : chest pain  or discomfort [Exercise Intolerance] : persistence of exercise intolerance [Palpitations] : no palpitations [Orthopnea] : no orthopnea [Fast HR] : no tachycardia [Tachypnea] : not tachypneic [Wheezing] : no wheezing [Cough] : no cough [Shortness Of Breath] : not expressed as feeling short of breath [Vomiting] : no vomiting [Diarrhea] : no diarrhea [Abdominal Pain] : no abdominal pain [Decrease In Appetite] : appetite not decreased [Fainting (Syncope)] : no fainting [Seizure] : no seizures [Headache] : no headache [Dizziness] : no dizziness [Limping] : no limping [Joint Pains] : no arthralgias [Joint Swelling] : no joint swelling [Rash] : no rash [Wound problems] : no wound problems [Easy Bruising] : no tendency for easy bruising [Swollen Glands] : no lymphadenopathy [Easy Bleeding] : no ~M tendency for easy bleeding [Nosebleeds] : no epistaxis [Sleep Disturbances] : ~T no sleep disturbances [Hyperactive] : no hyperactive behavior [Depression] : no depression [Anxiety] : anxiety [Failure To Thrive] : no failure to thrive [Short Stature] : short stature was not noted [Jitteriness] : no jitteriness [Heat/Cold Intolerance] : no temperature intolerance [Dec Urine Output] : no oliguria

## 2025-05-06 NOTE — DISCUSSION/SUMMARY
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 She is s/p LPA stent 2/13/24. She comes into clinic today w/ acute symptoms of decreased exercise tolerance and intermittent, random, self-resolving chest pain w/ no associated symptoms. She does endorse that when she worked out yesterday she was going on 4 hours of sleep, so may have pushed herself to hard without resting. She also endorses that she may have caused herself some anxiety by googling her symptoms. Her EKG, vitals, and physical exam today are all at baseline and not concerning. Will plan on having her rest and not exercise for the rest of the week and see if she can allow herself to recover somewhat. She was advised to reach out to us if symptoms were to persist, worsen, or change; at which point we may get an echo and or labs and or a Zio. Advised to f/u w/ Dr. Martell as previously scheduled.  Plan: - Continue aspirin 81mg PO qday.  - Rest for the next week - Reach out to us if symptoms were to persist, worsen, or change; at which point we may get an echo and or labs and or a Zio - F/u w/ Dr. Martell as previously scheduled [Needs SBE Prophylaxis] : [unfilled]  needs bacterial endocarditis prophylaxis. SBE prophylaxis is indicated for dental and invasive ENT procedures. (Circulation. 2007; 116: [id number])